# Patient Record
Sex: FEMALE | Race: WHITE
[De-identification: names, ages, dates, MRNs, and addresses within clinical notes are randomized per-mention and may not be internally consistent; named-entity substitution may affect disease eponyms.]

---

## 2019-01-18 ENCOUNTER — HOSPITAL ENCOUNTER (EMERGENCY)
Dept: HOSPITAL 96 - M.ERS | Age: 39
Discharge: HOME | End: 2019-01-18
Payer: COMMERCIAL

## 2019-01-18 VITALS — WEIGHT: 185.01 LBS | BODY MASS INDEX: 29.04 KG/M2 | HEIGHT: 67 IN

## 2019-01-18 VITALS — SYSTOLIC BLOOD PRESSURE: 130 MMHG | DIASTOLIC BLOOD PRESSURE: 78 MMHG

## 2019-01-18 DIAGNOSIS — Z88.8: ICD-10-CM

## 2019-01-18 DIAGNOSIS — Z88.5: ICD-10-CM

## 2019-01-18 DIAGNOSIS — I47.1: Primary | ICD-10-CM

## 2019-01-18 LAB
ABSOLUTE BASOPHILS: 0.1 THOU/UL (ref 0–0.2)
ABSOLUTE EOSINOPHILS: 0 THOU/UL (ref 0–0.7)
ABSOLUTE MONOCYTES: 0.9 THOU/UL (ref 0–1.2)
ALBUMIN SERPL-MCNC: 4.1 G/DL (ref 3.4–5)
ALP SERPL-CCNC: 58 U/L (ref 46–116)
ALT SERPL-CCNC: 24 U/L (ref 30–65)
ANION GAP SERPL CALC-SCNC: 12 MMOL/L (ref 7–16)
AST SERPL-CCNC: 15 U/L (ref 15–37)
BASOPHILS NFR BLD AUTO: 0.5 %
BILIRUB SERPL-MCNC: 0.4 MG/DL
BUN SERPL-MCNC: 13 MG/DL (ref 7–18)
CALCIUM SERPL-MCNC: 9.6 MG/DL (ref 8.5–10.1)
CHLORIDE SERPL-SCNC: 102 MMOL/L (ref 98–107)
CO2 SERPL-SCNC: 25 MMOL/L (ref 21–32)
CREAT SERPL-MCNC: 0.9 MG/DL (ref 0.6–1.3)
EOSINOPHIL NFR BLD: 0 %
GLUCOSE SERPL-MCNC: 92 MG/DL (ref 70–99)
GRANULOCYTES NFR BLD MANUAL: 64.2 %
HCT VFR BLD CALC: 43.1 % (ref 37–47)
HGB BLD-MCNC: 14.6 GM/DL (ref 12–15)
INR PPP: 1
LYMPHOCYTES # BLD: 2.7 THOU/UL (ref 0.8–5.3)
LYMPHOCYTES NFR BLD AUTO: 26.7 %
MCH RBC QN AUTO: 30.7 PG (ref 26–34)
MCHC RBC AUTO-ENTMCNC: 34 G/DL (ref 28–37)
MCV RBC: 90.5 FL (ref 80–100)
MONOCYTES NFR BLD: 8.6 %
MPV: 8.9 FL. (ref 7.2–11.1)
NEUTROPHILS # BLD: 6.6 THOU/UL (ref 1.6–8.1)
NT-PRO BRAIN NAT PEPTIDE: 31 PG/ML (ref ?–300)
NUCLEATED RBCS: 0 /100WBC
PLATELET COUNT*: 262 THOU/UL (ref 150–400)
POTASSIUM SERPL-SCNC: 3.9 MMOL/L (ref 3.5–5.1)
PROT SERPL-MCNC: 8 G/DL (ref 6.4–8.2)
PROTHROMBIN TIME: 9.9 SECONDS (ref 9.2–11.5)
RBC # BLD AUTO: 4.77 MIL/UL (ref 4.2–5)
RDW-CV: 13.9 % (ref 10.5–14.5)
SODIUM SERPL-SCNC: 139 MMOL/L (ref 136–145)
TROPONIN-I LEVEL: <0.06 NG/ML (ref ?–0.06)
WBC # BLD AUTO: 10.2 THOU/UL (ref 4–11)

## 2019-01-20 NOTE — EKG
[place] [address]
Phone:  [phone]                     ELECTROCARDIOGRAM REPORT      
_______________________________________________________________________________
 
Name:       ROGERS RODRIGUEZ               Room:                      [facility]#:  [id number]      Account #:      [id number]  
Admission:  19     Attend Phys:                         
Discharge:  19     Date of Birth:  [birth year]  
         Report #: [id number]
    [id number]
_______________________________________________________________________________
THIS REPORT FOR:  //name//                      
 
                         [facility] ED
                                       
Test Date:    2019               Test Time:    17:56:00
Pat Name:     ROGERS RODRIGUEZ           Department:   
Patient ID:   [id number]            Room:          
Gender:       F                        Technician:   MINA HARLEY
:          1980               Requested By: Eliezer Stephens
Order Number: [id number] MD:   Eyad Manzo
                                 Measurements
Intervals                              Axis          
Rate:         180                      P:            0
N[place]:           43                       QRS:          55
QRSD:         85                       T:            63
QT:           273                                    
QTc:          473                                    
                           Interpretive Statements
Supraventricular tachycardia
ST depression, probably rate related
Compared to ECG 08/15/2014 08:15:39
ST (T wave) deviation now present
Sinus rhythm no longer present
Sinus arrhythmia no longer present
 
Electronically Signed On 2019 12:26:38 CST by Eyad Manzo
[email or web address]
 
 
 
 
 
 
 
 
 
 
 
 
 
 
 
 
  <ELECTRONICALLY SIGNED>
                                           By: Eyad Manzo MD, FACC   
  19     1226
D: 19 1756   _____________________________________
T: 19 1756   Eyad Manzo MD, [facility]     /EPI

## 2019-01-20 NOTE — EKG
[place] [address]
Phone:  [phone]                     ELECTROCARDIOGRAM REPORT      
_______________________________________________________________________________
 
Name:       ROGERS RODRIGUEZ               Room:                      [facility]#:  [id number]      Account #:      [id number]  
Admission:  19     Attend Phys:                         
Discharge:  19     Date of Birth:  [birth year]  
         Report #: [id number]
    [id number]
_______________________________________________________________________________
THIS REPORT FOR:  //name//                      
 
                         [facility] ED
                                       
Test Date:    2019               Test Time:    18:37:45
Pat Name:     ROGERS RODRIGUEZ           Department:   
Patient ID:   [id number]            Room:          
Gender:       F                        Technician:   MINA HARLEY
:          1980               Requested By: Eliezer Stephens
Order Number: [id number] MD:   Eyad Manzo
                                 Measurements
Intervals                              Axis          
Rate:         78                       P:            37
NC:           151                      QRS:          19
QRSD:         89                       T:            29
QT:           350                                    
QTc:          399                                    
                           Interpretive Statements
Sinus rhythm
Compared to ECG 08/15/2014 08:15:39
Sinus arrhythmia no longer present
 
Electronically Signed On 2019 12:26:49 CST by Eyad Manzo
[email or web address]
 
 
 
 
 
 
 
 
 
 
 
 
 
 
 
 
 
 
 
  <ELECTRONICALLY SIGNED>
                                           By: Eyad Manzo MD, [facility]   
  19     1226
D: 19 1837   _____________________________________
T: 19 183   Eyad Manzo MD, FACC     /EPI